# Patient Record
Sex: MALE | Race: WHITE | NOT HISPANIC OR LATINO | Employment: OTHER | URBAN - METROPOLITAN AREA
[De-identification: names, ages, dates, MRNs, and addresses within clinical notes are randomized per-mention and may not be internally consistent; named-entity substitution may affect disease eponyms.]

---

## 2017-08-15 ENCOUNTER — HISTORICAL (OUTPATIENT)
Dept: ADMINISTRATIVE | Facility: HOSPITAL | Age: 32
End: 2017-08-15

## 2017-08-15 LAB
ALBUMIN SERPL-MCNC: 3.9 GM/DL (ref 3.4–5)
ALBUMIN/GLOB SERPL: 1.3 RATIO (ref 1.1–2)
ALP SERPL-CCNC: 67 UNIT/L (ref 50–136)
ALT SERPL-CCNC: 17 UNIT/L (ref 12–78)
AST SERPL-CCNC: 14 UNIT/L (ref 15–37)
BILIRUB SERPL-MCNC: 0.6 MG/DL (ref 0.2–1)
BILIRUBIN DIRECT+TOT PNL SERPL-MCNC: 0.1 MG/DL (ref 0–0.5)
BILIRUBIN DIRECT+TOT PNL SERPL-MCNC: 0.5 MG/DL (ref 0–0.8)
BUN SERPL-MCNC: 12 MG/DL (ref 7–18)
CALCIUM SERPL-MCNC: 9.3 MG/DL (ref 8.5–10.1)
CHLORIDE SERPL-SCNC: 107 MMOL/L (ref 98–107)
CO2 SERPL-SCNC: 28 MMOL/L (ref 21–32)
CREAT SERPL-MCNC: 0.98 MG/DL (ref 0.7–1.3)
DEPRECATED CALCIDIOL+CALCIFEROL SERPL-MC: 40.06 NG/ML (ref 30–80)
ERYTHROCYTE [DISTWIDTH] IN BLOOD BY AUTOMATED COUNT: 12.6 % (ref 11.5–17)
GLOBULIN SER-MCNC: 2.9 GM/DL (ref 2.4–3.5)
GLUCOSE SERPL-MCNC: 92 MG/DL (ref 74–106)
HCT VFR BLD AUTO: 41.5 % (ref 42–52)
HGB BLD-MCNC: 13.9 GM/DL (ref 14–18)
MCH RBC QN AUTO: 30.9 PG (ref 27–31)
MCHC RBC AUTO-ENTMCNC: 33.5 GM/DL (ref 33–36)
MCV RBC AUTO: 92.2 FL (ref 80–94)
PLATELET # BLD AUTO: 203 X10(3)/MCL (ref 130–400)
PMV BLD AUTO: 10.5 FL (ref 9.4–12.4)
POTASSIUM SERPL-SCNC: 4.2 MMOL/L (ref 3.5–5.1)
PROT SERPL-MCNC: 6.8 GM/DL (ref 6.4–8.2)
PSA SERPL-MCNC: 0.27 NG/ML (ref 0–4)
RBC # BLD AUTO: 4.5 X10(6)/MCL (ref 4.7–6.1)
SODIUM SERPL-SCNC: 143 MMOL/L (ref 136–145)
WBC # SPEC AUTO: 8.5 X10(3)/MCL (ref 4.5–11.5)

## 2023-10-04 ENCOUNTER — OFFICE VISIT (OUTPATIENT)
Dept: FAMILY MEDICINE | Facility: CLINIC | Age: 38
End: 2023-10-04
Payer: COMMERCIAL

## 2023-10-04 VITALS
OXYGEN SATURATION: 96 % | WEIGHT: 173.63 LBS | HEIGHT: 71 IN | BODY MASS INDEX: 24.31 KG/M2 | HEART RATE: 82 BPM | DIASTOLIC BLOOD PRESSURE: 62 MMHG | SYSTOLIC BLOOD PRESSURE: 130 MMHG | TEMPERATURE: 98 F

## 2023-10-04 DIAGNOSIS — L73.2 HYDRADENITIS: Primary | ICD-10-CM

## 2023-10-04 DIAGNOSIS — Z28.21 INFLUENZA VACCINATION DECLINED BY PATIENT: ICD-10-CM

## 2023-10-04 DIAGNOSIS — F17.210 CIGARETTE SMOKER: ICD-10-CM

## 2023-10-04 PROCEDURE — 3078F DIAST BP <80 MM HG: CPT | Mod: CPTII,,, | Performed by: NURSE PRACTITIONER

## 2023-10-04 PROCEDURE — 99203 OFFICE O/P NEW LOW 30 MIN: CPT | Mod: ,,, | Performed by: NURSE PRACTITIONER

## 2023-10-04 PROCEDURE — 1160F RVW MEDS BY RX/DR IN RCRD: CPT | Mod: CPTII,,, | Performed by: NURSE PRACTITIONER

## 2023-10-04 PROCEDURE — 3008F BODY MASS INDEX DOCD: CPT | Mod: CPTII,,, | Performed by: NURSE PRACTITIONER

## 2023-10-04 PROCEDURE — 3078F PR MOST RECENT DIASTOLIC BLOOD PRESSURE < 80 MM HG: ICD-10-PCS | Mod: CPTII,,, | Performed by: NURSE PRACTITIONER

## 2023-10-04 PROCEDURE — 3075F PR MOST RECENT SYSTOLIC BLOOD PRESS GE 130-139MM HG: ICD-10-PCS | Mod: CPTII,,, | Performed by: NURSE PRACTITIONER

## 2023-10-04 PROCEDURE — 1159F PR MEDICATION LIST DOCUMENTED IN MEDICAL RECORD: ICD-10-PCS | Mod: CPTII,,, | Performed by: NURSE PRACTITIONER

## 2023-10-04 PROCEDURE — 99203 PR OFFICE/OUTPT VISIT, NEW, LEVL III, 30-44 MIN: ICD-10-PCS | Mod: ,,, | Performed by: NURSE PRACTITIONER

## 2023-10-04 PROCEDURE — 1159F MED LIST DOCD IN RCRD: CPT | Mod: CPTII,,, | Performed by: NURSE PRACTITIONER

## 2023-10-04 PROCEDURE — 3008F PR BODY MASS INDEX (BMI) DOCUMENTED: ICD-10-PCS | Mod: CPTII,,, | Performed by: NURSE PRACTITIONER

## 2023-10-04 PROCEDURE — 3075F SYST BP GE 130 - 139MM HG: CPT | Mod: CPTII,,, | Performed by: NURSE PRACTITIONER

## 2023-10-04 PROCEDURE — 1160F PR REVIEW ALL MEDS BY PRESCRIBER/CLIN PHARMACIST DOCUMENTED: ICD-10-PCS | Mod: CPTII,,, | Performed by: NURSE PRACTITIONER

## 2023-10-04 RX ORDER — TRAMADOL HYDROCHLORIDE 50 MG/1
50 TABLET ORAL EVERY 6 HOURS PRN
COMMUNITY
Start: 2023-08-01

## 2023-10-04 RX ORDER — DOXYCYCLINE 100 MG/1
100 CAPSULE ORAL 2 TIMES DAILY
Qty: 28 CAPSULE | Refills: 0 | Status: SHIPPED | OUTPATIENT
Start: 2023-10-04 | End: 2023-10-18

## 2023-10-04 RX ORDER — DOXYCYCLINE 100 MG/1
100 CAPSULE ORAL 2 TIMES DAILY
COMMUNITY
Start: 2023-08-12 | End: 2023-10-04 | Stop reason: SDUPTHER

## 2023-10-04 NOTE — PROGRESS NOTES
Patient ID: Juventino Doherty  : 1985     Chief Complaint: Establish Care (Establish care)    Allergies: Patient has No Known Allergies.     History of Present Illness:  The patient is a 38 y.o. White male who presents to clinic for evaluation and management with a chief complaint of Establish Care (Establish care)   Patient diagnosed with hidradenitis at urgent care over 6 months ago. Had outbreak of boils under both arms. Took doxycycline for 2 weeks with significant improvement. Currently has a few small bumps under right arm. No longer uses stick deodorants.          Past Medical History:  has a past medical history of Hidradenitis suppurativa.    Social History:  reports that he has been smoking cigarettes. He has never used smokeless tobacco. He reports that he does not currently use alcohol. He reports that he does not use drugs.    Care Team: Patient Care Team:  Max Lovett APRN as PCP - General (Family Medicine)     Current Medications:  Current Outpatient Medications   Medication Instructions    doxycycline (VIBRAMYCIN) 100 mg, Oral, 2 times daily    traMADoL (ULTRAM) 50 mg, Oral, Every 6 hours PRN       Review of Systems   Constitutional:  Negative for appetite change, fatigue, fever and unexpected weight change.   HENT:  Negative for nasal congestion, ear pain, facial swelling, hearing loss, mouth sores, nosebleeds, sore throat and trouble swallowing.    Eyes:  Negative for pain, discharge, redness and visual disturbance.   Respiratory:  Negative for cough, chest tightness and shortness of breath.    Cardiovascular:  Negative for chest pain, palpitations and leg swelling.   Gastrointestinal:  Negative for abdominal pain, blood in stool, constipation, diarrhea and nausea.   Endocrine: Negative for cold intolerance, heat intolerance, polydipsia, polyphagia and polyuria.   Genitourinary:  Negative for difficulty urinating, dysuria, frequency and hematuria.   Musculoskeletal:  Negative for arthralgias,  "joint swelling and joint deformity.   Integumentary:  Positive for mole/lesion. Negative for color change and rash.   Neurological:  Negative for dizziness, weakness, headaches and memory loss.   Hematological:  Negative for adenopathy. Does not bruise/bleed easily.   Psychiatric/Behavioral:  Negative for confusion, sleep disturbance and suicidal ideas.         Visit Vitals  /62 (BP Location: Left arm)   Pulse 82   Temp 97.9 °F (36.6 °C) (Temporal)   Ht 5' 11" (1.803 m)   Wt 78.7 kg (173 lb 9.6 oz)   SpO2 96%   BMI 24.21 kg/m²       Physical Exam  Vitals reviewed.   Constitutional:       General: He is not in acute distress.     Appearance: Normal appearance.   HENT:      Head: Normocephalic and atraumatic.      Right Ear: Tympanic membrane, ear canal and external ear normal.      Left Ear: Tympanic membrane, ear canal and external ear normal.      Nose: Nose normal. No congestion.      Mouth/Throat:      Mouth: Mucous membranes are moist.      Pharynx: Oropharynx is clear. No oropharyngeal exudate or posterior oropharyngeal erythema.   Eyes:      Conjunctiva/sclera: Conjunctivae normal.      Comments: Wearing eye glasses   Cardiovascular:      Rate and Rhythm: Normal rate and regular rhythm.      Pulses: Normal pulses.      Heart sounds: No murmur heard.  Pulmonary:      Effort: Pulmonary effort is normal.      Breath sounds: Normal breath sounds.   Abdominal:      General: Bowel sounds are normal.      Palpations: Abdomen is soft.      Tenderness: There is no abdominal tenderness.   Musculoskeletal:         General: No swelling, tenderness or deformity. Normal range of motion.      Cervical back: Neck supple.   Lymphadenopathy:      Cervical: No cervical adenopathy.   Skin:     General: Skin is warm and dry.      Capillary Refill: Capillary refill takes less than 2 seconds.      Coloration: Skin is not jaundiced.      Findings: Lesion (2 firm tender nodules to right axillae, largest 1.5cm with overlying " erythema) present. No rash.   Neurological:      Mental Status: He is alert and oriented to person, place, and time.      Cranial Nerves: No cranial nerve deficit.   Psychiatric:         Mood and Affect: Mood normal.         Behavior: Behavior normal.          Labs Reviewed:  Chemistry:  Lab Results   Component Value Date     08/15/2017    K 4.2 08/15/2017    CHLORIDE 107 08/15/2017    BUN 12.0 08/15/2017    CREATININE 0.98 08/15/2017    GLUCOSE 92 08/15/2017    CALCIUM 9.3 08/15/2017    ALKPHOS 67 08/15/2017    LABPROT 6.8 08/15/2017    ALBUMIN 3.90 08/15/2017    BILIDIR 0.10 08/15/2017    IBILI 0.50 08/15/2017    AST 14 (L) 08/15/2017    ALT 17 08/15/2017    JGHXPMGP85BU 40.06 08/15/2017    PSA 0.27 08/15/2017          Hematology:  Lab Results   Component Value Date    WBC 8.5 08/15/2017    RBC 4.50 (L) 08/15/2017    HGB 13.9 (L) 08/15/2017    HCT 41.5 (L) 08/15/2017    MCV 92.2 08/15/2017    MCH 30.9 08/15/2017    MCHC 33.5 08/15/2017    RDW 12.6 08/15/2017     08/15/2017    MPV 10.5 08/15/2017           Assessment & Plan:  1. Hydradenitis  Overview:  Takes doxycycline and ibuprofen during flare up. Refill doxycycline at this time. Patient will consider dermatology referral if frequent flares.       2. Influenza vaccination declined by patient    3. Cigarette smoker  Assessment & Plan:  Patient declines smoking cessation.      Other orders  -     doxycycline (VIBRAMYCIN) 100 MG Cap; Take 1 capsule (100 mg total) by mouth 2 (two) times daily. for 14 days  Dispense: 28 capsule; Refill: 0         No future appointments.    Follow up for at patient's convenience Wellness, Fasting labs prior. Call sooner if needed.    SARAH ESCALANTE

## 2023-11-09 ENCOUNTER — TELEPHONE (OUTPATIENT)
Dept: FAMILY MEDICINE | Facility: CLINIC | Age: 38
End: 2023-11-09
Payer: COMMERCIAL

## 2024-04-23 ENCOUNTER — OFFICE VISIT (OUTPATIENT)
Dept: FAMILY MEDICINE | Facility: CLINIC | Age: 39
End: 2024-04-23
Payer: COMMERCIAL

## 2024-04-23 VITALS
TEMPERATURE: 98 F | HEART RATE: 92 BPM | DIASTOLIC BLOOD PRESSURE: 62 MMHG | SYSTOLIC BLOOD PRESSURE: 110 MMHG | WEIGHT: 180 LBS | OXYGEN SATURATION: 98 % | BODY MASS INDEX: 25.2 KG/M2 | HEIGHT: 71 IN

## 2024-04-23 DIAGNOSIS — Z28.21 INFLUENZA VACCINATION DECLINED BY PATIENT: ICD-10-CM

## 2024-04-23 DIAGNOSIS — Z00.00 ENCOUNTER FOR WELLNESS EXAMINATION IN ADULT: Primary | ICD-10-CM

## 2024-04-23 DIAGNOSIS — E78.49 OTHER HYPERLIPIDEMIA: ICD-10-CM

## 2024-04-23 DIAGNOSIS — L73.2 HYDRADENITIS: ICD-10-CM

## 2024-04-23 DIAGNOSIS — F17.210 CIGARETTE SMOKER: ICD-10-CM

## 2024-04-23 PROCEDURE — 3074F SYST BP LT 130 MM HG: CPT | Mod: CPTII,,, | Performed by: NURSE PRACTITIONER

## 2024-04-23 PROCEDURE — 3008F BODY MASS INDEX DOCD: CPT | Mod: CPTII,,, | Performed by: NURSE PRACTITIONER

## 2024-04-23 PROCEDURE — 3044F HG A1C LEVEL LT 7.0%: CPT | Mod: CPTII,,, | Performed by: NURSE PRACTITIONER

## 2024-04-23 PROCEDURE — 1160F RVW MEDS BY RX/DR IN RCRD: CPT | Mod: CPTII,,, | Performed by: NURSE PRACTITIONER

## 2024-04-23 PROCEDURE — 1159F MED LIST DOCD IN RCRD: CPT | Mod: CPTII,,, | Performed by: NURSE PRACTITIONER

## 2024-04-23 PROCEDURE — 99396 PREV VISIT EST AGE 40-64: CPT | Mod: ,,, | Performed by: NURSE PRACTITIONER

## 2024-04-23 PROCEDURE — 3078F DIAST BP <80 MM HG: CPT | Mod: CPTII,,, | Performed by: NURSE PRACTITIONER

## 2024-04-23 NOTE — PROGRESS NOTES
Patient ID: Juventino Doherty  : 1985    Chief Complaint: Annual Exam    Allergies: Patient has No Known Allergies.     History of Present Illness:  The patient is a 39 y.o. White male who presents to clinic for annual wellness visit.    Diet and nutrition:  Diet is high in salt, high in fat, low in fiber, high caloric intake, high carbohydrate meals, high calcium intake.    Fracture risk: No history of fracture, no recent unexplained fracture.    Physical activity:  Does not exercise on a regular basis, good physical condition.    Depression risks:  No history of depression, never feel sad, empty, or tearful, no sleep disturbances, no agitation, no loss of energy, no feelings of worthlessness or guilt, no thoughts of suicide.    Orientation:  No disorientation to time, no disorientation to place.    Concentration and memory:  No decreased concentration ability, no memory lapses or loss, does not forget words.    Speech forward/motor difficulties: No speech difficulties, no difficulty expressing formulated concepts, no difficulty with fine manipulative tasks, no difficulty writing forward/copying, no slowed reaction time, does not knock things over when trying to pick them up.    Fall risk assessment:  No frequent falls while walking, no fall in the past year, no dizziness forward/vertigo, no fear of falling.  Hearing:  No loss of hearing, does not wear hearing aids.    Vision:  No vision problems, does wear glasses.    Activity of daily living: Able to bathe with limited or no assistance, able to control urination and bowels, able to dress with limited or no assistance, able to feed self with limited or no assistance, able to get out of chair or bed with limited or no assistance, able to [place] with limited or no assistance, able to toilet with limited are no assistance.    Activities of daily living:  Able to do housework with limited or no assistance, able to grocery shop with limited or no assistance, able to  manage medications with limited or no assistance, able to manage money with limited or no assistance, able to prepare meals with limited or no assistance, able to use the phone with limited or no assistance.    Screenings: due for vaccinations, not due for colorectal cancer screening.      Past Medical History:  has a past medical history of Hidradenitis suppurativa.    Surgical History:  has no past surgical history on file.    Family History: family history includes Diabetes in his father; Fibromyalgia in his mother; Hypertension in his father; Irritable bowel syndrome in his mother.    Social History:  reports that he has been smoking cigarettes. He started smoking about 22 years ago. He has a 22.3 pack-year smoking history. He has been exposed to tobacco smoke. He has never used smokeless tobacco. He reports that he does not currently use alcohol. He reports that he does not use drugs.    Care Team: Patient Care Team:  Max Lovett APRN as PCP - General (Family Medicine)     Current Medications:  No current outpatient medications    Review of Systems   Constitutional:  Negative for appetite change, fatigue, fever and unexpected weight change.   HENT:  Negative for nasal congestion, ear pain, facial swelling, hearing loss, mouth sores, nosebleeds, sore throat and trouble swallowing.    Eyes:  Negative for pain, discharge, redness and visual disturbance.   Respiratory:  Negative for cough, chest tightness and shortness of breath.    Cardiovascular:  Negative for chest pain, palpitations and leg swelling.   Gastrointestinal:  Negative for abdominal pain, blood in stool, constipation, diarrhea and nausea.   Endocrine: Negative for cold intolerance, heat intolerance, polydipsia, polyphagia and polyuria.   Genitourinary:  Negative for difficulty urinating, dysuria, frequency and hematuria.   Musculoskeletal:  Negative for arthralgias, joint swelling and joint deformity.   Integumentary:  Positive for mole/lesion.  "Negative for color change and rash.   Neurological:  Negative for dizziness, weakness, headaches and memory loss.   Hematological:  Negative for adenopathy. Does not bruise/bleed easily.   Psychiatric/Behavioral:  Negative for confusion, sleep disturbance and suicidal ideas.         Visit Vitals  /62 (BP Location: Right arm)   Pulse 92   Temp 98.2 °F (36.8 °C) (Temporal)   Ht 5' 11" (1.803 m)   Wt 81.6 kg (180 lb)   SpO2 98%   BMI 25.10 kg/m²       Physical Exam  Vitals reviewed.   Constitutional:       General: He is not in acute distress.     Appearance: Normal appearance.   HENT:      Head: Normocephalic and atraumatic.      Right Ear: Tympanic membrane, ear canal and external ear normal.      Left Ear: Tympanic membrane, ear canal and external ear normal.      Nose: Nose normal. No congestion.      Mouth/Throat:      Mouth: Mucous membranes are moist.      Pharynx: Oropharynx is clear. No oropharyngeal exudate or posterior oropharyngeal erythema.   Eyes:      Conjunctiva/sclera: Conjunctivae normal.      Comments: Wearing eye glasses   Cardiovascular:      Rate and Rhythm: Normal rate and regular rhythm.      Pulses: Normal pulses.      Heart sounds: No murmur heard.  Pulmonary:      Effort: Pulmonary effort is normal.      Breath sounds: Normal breath sounds.   Abdominal:      General: Bowel sounds are normal.      Palpations: Abdomen is soft.      Tenderness: There is no abdominal tenderness.   Musculoskeletal:         General: No swelling, tenderness or deformity. Normal range of motion.      Cervical back: Neck supple.   Lymphadenopathy:      Cervical: No cervical adenopathy.   Skin:     General: Skin is warm and dry.      Capillary Refill: Capillary refill takes less than 2 seconds.      Coloration: Skin is not jaundiced.      Findings: No rash.   Neurological:      Mental Status: He is alert and oriented to person, place, and time.      Cranial Nerves: No cranial nerve deficit.   Psychiatric:         " Mood and Affect: Mood normal.         Behavior: Behavior normal.          Labs Reviewed:  Chemistry:  Lab Results   Component Value Date     04/16/2024    K 4.6 04/16/2024    CHLORIDE 107 08/15/2017    BUN 8 04/16/2024    CREATININE 1.06 04/16/2024    EGFRNORACEVR 90 04/16/2024    GLUCOSE 92 08/15/2017    CALCIUM 9.7 04/16/2024    ALKPHOS 67 08/15/2017    LABPROT 6.8 08/15/2017    ALBUMIN 4.3 04/16/2024    BILIDIR 0.10 08/15/2017    IBILI 0.50 08/15/2017    AST 24 04/16/2024    ALT 23 04/16/2024    [id number] 40.06 08/15/2017    TSH 0.821 04/16/2024    PSA 0.27 08/15/2017        Lab Results   Component Value Date    HGBA1C 5.6 04/16/2024        Hematology:  Lab Results   Component Value Date    WBC 10.4 04/16/2024    RBC 4.66 04/16/2024    HGB 15.0 04/16/2024    HCT 44.2 04/16/2024    MCV 95 04/16/2024    MCH 32.2 04/16/2024    MCHC 33.9 04/16/2024    RDW 13.3 04/16/2024     04/16/2024    MPV 10.5 08/15/2017    MONO 8 04/16/2024    MONO 0.8 04/16/2024    BASO 0.1 04/16/2024    EOSINOPHIL 2 04/16/2024    BASOPHIL 1 04/16/2024       Lipid Panel:  Lab Results   Component Value Date    CHOL 249 (H) 04/16/2024    HDL 38 (L) 04/16/2024    TRIG 125 04/16/2024        Assessment & Plan:  1. Encounter for wellness examination in adult  Overview:  Colon Cancer Screening -  not due   Eye Exam- Recommend annually. Referred   Dental Exam- Recommend biannually.  Lung Cancer- not due      Orders:  -     Ambulatory referral/consult to Optometry; Future; Expected date: 04/30/2024    2. Hydradenitis  Overview:  Takes doxycycline and ibuprofen during flare up. Recurrent flares. Patient is ready for dermatology referral     Orders:  -     Ambulatory referral/consult to Dermatology; Future; Expected date: 04/30/2024    3. Other hyperlipidemia  Assessment & Plan:  The 10-year ASCVD risk score (Whitney LUZ, et al., 2019) is: 6.9%    Values used to calculate the score:      Age: 39 years      Sex: Male      Is Non-  : No      Diabetic: No      Tobacco smoker: Yes      Systolic Blood Pressure: 110 mmHg      Is BP treated: No      HDL Cholesterol: 38 mg/dL      Total Cholesterol: 249 mg/dL      The lipid level is currently abnormal. I would like to ask you to start using a low cholesterol diet, increase exercise and strive for weight loss. We will recheck these levels in one year.  Please refer to the following guidelines:    Cholesterol is a naturally occurring fatty substance that has both good and bad effects on the body.  On the good side,  it builds natural hormones and helps build and maintain nerve cells.  When your body has too much cholesterol, blood vessel walls can thicken and reduce circulation contributing to heart attacks and strokes. Most of the cholesterol in your blood is made by your liver from the fats, carbohydrates, and proteins  that you eat. Your provider may also check the amount of LDL (low-density lipoprotein) and HDL (high-density lipoprotein) in your blood.  LDLs carry a lot of cholesterol, leave behind fatty deposits on your artery walls, and contribute to heart disease.  HDLs do the opposite.  They clean the artery walls and remove extra cholesterol from the body, thus lowering the risk of heart disease.  LDL is sometimes called bad cholesterol and HDL good cholesterol.  It is desirable to have low levels of LDL and high levels of HDL.  (Smoking lowers HDL levels.)     Levels:   Total Cholesterol   ----------------------------------------   200 or below       good    200 to 239         borderline high    240 or above       high   ----------------------------------------    LDL Cholesterol   ---------------------------------------  130 or below       good   130 to 159         borderline high   160 or above       high   ---------------------------------------    For HDL, a level of 35 mg/dL or below is too low.  The recommended HDL level is 45 mg/dL or higher.        Controlling Your  Cholesterol Level   Cholesterol levels can usually be controlled by eating right and exercising.    Follow these diet guidelines to help control your cholesterol:   · Adjust the amount of calories you eat to maintain a lean body weight.    · Reduce the amount of fat you eat.  Fats should contribute no more than 30% of your daily calories and only 10% of the fat you eat should be saturated fat.    To control the amount of fat and cholesterol you eat:   · Check food labels for fat and cholesterol content.    · Limit the amount of butter and margarine you eat.    · Remove the skin before you cook chicken or turkey.    · Drink skim milk.    · Use sunflower, safflower, soybean, canola, or olive oil rather than tropical oils such as palm or coconut.    · Choose lean cuts of meat.    · Eat lean chicken, turkey, and fish instead of red meat.    · Use salad dressings and margarine made with monounsaturated fats.    · Eat fruits, vegetables, beans, and whole grains daily.  The fiber in these foods helps lower cholesterol.    · Use egg whites rather than whole eggs.    · Use low-fat yogurt or low-fat cottage cheese instead of sour cream.      Exercise goes hand-in-hand with good nutrition for controlling cholesterol.  Exercise not only helps you keep your weight down, but also can increase your HDL (good cholesterol) level.    A good exercise program includes aerobic exercise.  Aerobic exercise is any activity that keeps your heart rate up (such as swimming, jogging, walking, and bicycling).  You should get 20 to 30 minutes of aerobic exercise every other day.  Start your new exercise program slowly.  Make sure to stretch before and after you exercise.            4. Cigarette smoker  Assessment & Plan:  Declines smoking cessation       5. Influenza vaccination declined by patient         Vaccinations:    There is no immunization history on file for this patient.    Future Appointments   Date Time Provider Department Center    10/17/2024  8:50 AM [facility]   10/23/2024  1:00 PM Max Lovett APRN [facility]   4/17/2025  8:20 AM [facility], [facility]   4/24/2025  9:00 AM [facility]       Follow up for print endcare sum & pt educ 1)6mo Chol, Fasting labs prior, 2), 1 year, Wellness, fasting labs prior. Call sooner if needed.    SARAH ESCALANTE    Lab Frequency Next Occurrence   Ambulatory referral/consult to Dermatology Once 04/30/2024   Ambulatory referral/consult to Optometry Once 04/30/2024   Lipid Panel Once 10/23/2024   CBC Auto Differential Once 04/23/2025   Comprehensive Metabolic Panel Once 04/23/2025   Lipid Panel Once 04/23/2025   TSH Once 04/23/2025   Hemoglobin A1C Once 04/23/2025

## 2024-04-23 NOTE — ASSESSMENT & PLAN NOTE
The 10-year ASCVD risk score (Whitney LUZ, et al., 2019) is: 6.9%    Values used to calculate the score:      Age: 39 years      Sex: Male      Is Non- : No      Diabetic: No      Tobacco smoker: Yes      Systolic Blood Pressure: 110 mmHg      Is BP treated: No      HDL Cholesterol: 38 mg/dL      Total Cholesterol: 249 mg/dL      The lipid level is currently abnormal. I would like to ask you to start using a low cholesterol diet, increase exercise and strive for weight loss. We will recheck these levels in one year.  Please refer to the following guidelines:    Cholesterol is a naturally occurring fatty substance that has both good and bad effects on the body.  On the good side,  it builds natural hormones and helps build and maintain nerve cells.  When your body has too much cholesterol, blood vessel walls can thicken and reduce circulation contributing to heart attacks and strokes. Most of the cholesterol in your blood is made by your liver from the fats, carbohydrates, and proteins  that you eat. Your provider may also check the amount of LDL (low-density lipoprotein) and HDL (high-density lipoprotein) in your blood.  LDLs carry a lot of cholesterol, leave behind fatty deposits on your artery walls, and contribute to heart disease.  HDLs do the opposite.  They clean the artery walls and remove extra cholesterol from the body, thus lowering the risk of heart disease.  LDL is sometimes called bad cholesterol and HDL good cholesterol.  It is desirable to have low levels of LDL and high levels of HDL.  (Smoking lowers HDL levels.)     Levels:   Total Cholesterol   ----------------------------------------   200 or below       good    200 to 239         borderline high    240 or above       high   ----------------------------------------    LDL Cholesterol   ---------------------------------------  130 or below       good   130 to 159         borderline high   160 or above       high    ---------------------------------------    For HDL, a level of 35 mg/dL or below is too low.  The recommended HDL level is 45 mg/dL or higher.        Controlling Your Cholesterol Level   Cholesterol levels can usually be controlled by eating right and exercising.    Follow these diet guidelines to help control your cholesterol:   · Adjust the amount of calories you eat to maintain a lean body weight.    · Reduce the amount of fat you eat.  Fats should contribute no more than 30% of your daily calories and only 10% of the fat you eat should be saturated fat.    To control the amount of fat and cholesterol you eat:   · Check food labels for fat and cholesterol content.    · Limit the amount of butter and margarine you eat.    · Remove the skin before you cook chicken or turkey.    · Drink skim milk.    · Use sunflower, safflower, soybean, canola, or olive oil rather than tropical oils such as palm or coconut.    · Choose lean cuts of meat.    · Eat lean chicken, turkey, and fish instead of red meat.    · Use salad dressings and margarine made with monounsaturated fats.    · Eat fruits, vegetables, beans, and whole grains daily.  The fiber in these foods helps lower cholesterol.    · Use egg whites rather than whole eggs.    · Use low-fat yogurt or low-fat cottage cheese instead of sour cream.      Exercise goes hand-in-hand with good nutrition for controlling cholesterol.  Exercise not only helps you keep your weight down, but also can increase your HDL (good cholesterol) level.    A good exercise program includes aerobic exercise.  Aerobic exercise is any activity that keeps your heart rate up (such as swimming, jogging, walking, and bicycling).  You should get 20 to 30 minutes of aerobic exercise every other day.  Start your new exercise program slowly.  Make sure to stretch before and after you exercise.

## 2024-07-11 ENCOUNTER — TELEPHONE (OUTPATIENT)
Dept: FAMILY MEDICINE | Facility: CLINIC | Age: 39
End: 2024-07-11
Payer: COMMERCIAL

## 2024-07-11 DIAGNOSIS — F17.210 CIGARETTE SMOKER: Primary | ICD-10-CM

## 2024-07-11 RX ORDER — VARENICLINE TARTRATE 0.5 MG/1
TABLET, FILM COATED ORAL
Qty: 6 TABLET | Refills: 0 | Status: SHIPPED | OUTPATIENT
Start: 2024-07-11 | End: 2024-07-16

## 2024-07-11 RX ORDER — VARENICLINE TARTRATE 1 MG/1
1 TABLET, FILM COATED ORAL 2 TIMES DAILY
Qty: 60 TABLET | Refills: 2 | Status: SHIPPED | OUTPATIENT
Start: 2024-07-11

## 2024-07-11 NOTE — TELEPHONE ENCOUNTER
I have written for the following medications and/or orders for the patient.  Please notify the patient.  No orders of the defined types were placed in this encounter.      Medications Ordered This Encounter   Medications    varenicline (CHANTIX) 0.5 MG Tab     Sig: Take 1 tablet (0.5 mg total) by mouth once daily for 3 days, THEN 1 tablet (0.5 mg total) 2 (two) times daily for 3 days.     Dispense:  6 tablet     Refill:  0    varenicline (CHANTIX) 1 mg Tab     Sig: Take 1 tablet (1 mg total) by mouth 2 (two) times daily.     Dispense:  60 tablet     Refill:  2

## 2024-07-29 PROBLEM — Z00.00 ENCOUNTER FOR WELLNESS EXAMINATION IN ADULT: Status: RESOLVED | Noted: 2024-04-23 | Resolved: 2024-07-29

## 2025-04-22 ENCOUNTER — OFFICE VISIT (OUTPATIENT)
Dept: FAMILY MEDICINE | Facility: CLINIC | Age: 40
End: 2025-04-22
Payer: COMMERCIAL

## 2025-04-22 VITALS
TEMPERATURE: 98 F | BODY MASS INDEX: 25.37 KG/M2 | SYSTOLIC BLOOD PRESSURE: 110 MMHG | HEIGHT: 71 IN | DIASTOLIC BLOOD PRESSURE: 60 MMHG | HEART RATE: 90 BPM | OXYGEN SATURATION: 98 % | WEIGHT: 181.19 LBS

## 2025-04-22 DIAGNOSIS — Z87.891 HISTORY OF TOBACCO USE: ICD-10-CM

## 2025-04-22 DIAGNOSIS — Z00.00 ADULT WELLNESS VISIT: Primary | ICD-10-CM

## 2025-04-22 DIAGNOSIS — E78.49 OTHER HYPERLIPIDEMIA: ICD-10-CM

## 2025-04-22 PROBLEM — L73.2 HYDRADENITIS: Status: RESOLVED | Noted: 2023-10-04 | Resolved: 2025-04-22

## 2025-04-22 LAB
ALBUMIN SERPL-MCNC: 4.4 G/DL (ref 4.1–5.1)
ALP SERPL-CCNC: 63 IU/L (ref 44–121)
ALT SERPL-CCNC: 13 IU/L (ref 0–44)
AST SERPL-CCNC: 17 IU/L (ref 0–40)
BASOPHILS # BLD AUTO: 0.1 X10E3/UL (ref 0–0.2)
BASOPHILS NFR BLD AUTO: 1 %
BILIRUB SERPL-MCNC: 0.5 MG/DL (ref 0–1.2)
BUN SERPL-MCNC: 12 MG/DL (ref 6–24)
BUN/CREAT SERPL: 13 (ref 9–20)
CALCIUM SERPL-MCNC: 9.1 MG/DL (ref 8.7–10.2)
CHLORIDE SERPL-SCNC: 103 MMOL/L (ref 96–106)
CHOLEST SERPL-MCNC: 209 MG/DL (ref 100–199)
CO2 SERPL-SCNC: 24 MMOL/L (ref 20–29)
CREAT SERPL-MCNC: 0.96 MG/DL (ref 0.76–1.27)
EOSINOPHIL # BLD AUTO: 0.2 X10E3/UL (ref 0–0.4)
EOSINOPHIL NFR BLD AUTO: 2 %
ERYTHROCYTE [DISTWIDTH] IN BLOOD BY AUTOMATED COUNT: 12.9 % (ref 11.6–15.4)
EST. GFR  (NO RACE VARIABLE): 101 ML/MIN/1.73
GLOBULIN SER CALC-MCNC: 2.3 G/DL (ref 1.5–4.5)
GLUCOSE SERPL-MCNC: 105 MG/DL (ref 70–99)
HCT VFR BLD AUTO: 43.6 % (ref 37.5–51)
HDLC SERPL-MCNC: 38 MG/DL
HGB BLD-MCNC: 14.6 G/DL (ref 13–17.7)
IMM GRANULOCYTES NFR BLD AUTO: 0 %
LDLC SERPL CALC-MCNC: 147 MG/DL (ref 0–99)
LYMPHOCYTES # BLD AUTO: 3.9 X10E3/UL (ref 0.7–3.1)
LYMPHOCYTES NFR BLD AUTO: 39 %
MCH RBC QN AUTO: 31.2 PG (ref 26.6–33)
MCHC RBC AUTO-ENTMCNC: 33.5 G/DL (ref 31.5–35.7)
MCV RBC AUTO: 93 FL (ref 79–97)
MONOCYTES # BLD AUTO: 0.8 X10E3/UL (ref 0.1–0.9)
MONOCYTES NFR BLD AUTO: 8 %
NEUTROPHILS # BLD AUTO: 5 X10E3/UL (ref 1.4–7)
NEUTROPHILS NFR BLD AUTO: 50 %
PLATELET # BLD AUTO: 268 X10E3/UL (ref 150–450)
POTASSIUM SERPL-SCNC: 4.1 MMOL/L (ref 3.5–5.2)
PROT SERPL-MCNC: 6.7 G/DL (ref 6–8.5)
RBC # BLD AUTO: 4.68 X10E6/UL (ref 4.14–5.8)
SODIUM SERPL-SCNC: 139 MMOL/L (ref 134–144)
SPECIMEN STATUS REPORT: NORMAL
TRIGL SERPL-MCNC: 134 MG/DL (ref 0–149)
TSH SERPL DL<=0.005 MIU/L-ACNC: 0.58 UIU/ML (ref 0.45–4.5)
VLDLC SERPL CALC-MCNC: 24 MG/DL (ref 5–40)
WBC # BLD AUTO: 9.9 X10E3/UL (ref 3.4–10.8)

## 2025-04-22 PROCEDURE — 3074F SYST BP LT 130 MM HG: CPT | Mod: CPTII,,, | Performed by: NURSE PRACTITIONER

## 2025-04-22 PROCEDURE — 1160F RVW MEDS BY RX/DR IN RCRD: CPT | Mod: CPTII,,, | Performed by: NURSE PRACTITIONER

## 2025-04-22 PROCEDURE — 1159F MED LIST DOCD IN RCRD: CPT | Mod: CPTII,,, | Performed by: NURSE PRACTITIONER

## 2025-04-22 PROCEDURE — 3044F HG A1C LEVEL LT 7.0%: CPT | Mod: CPTII,,, | Performed by: NURSE PRACTITIONER

## 2025-04-22 PROCEDURE — 99396 PREV VISIT EST AGE 40-64: CPT | Mod: ,,, | Performed by: NURSE PRACTITIONER

## 2025-04-22 PROCEDURE — 3008F BODY MASS INDEX DOCD: CPT | Mod: CPTII,,, | Performed by: NURSE PRACTITIONER

## 2025-04-22 PROCEDURE — 3078F DIAST BP <80 MM HG: CPT | Mod: CPTII,,, | Performed by: NURSE PRACTITIONER

## 2025-04-22 NOTE — ASSESSMENT & PLAN NOTE
The 10-year ASCVD risk score (Whitney LUZ, et al., 2019) is: 1.7%    Values used to calculate the score:      Age: 40 years      Sex: Male      Is Non- : No      Diabetic: No      Tobacco smoker: No      Systolic Blood Pressure: 110 mmHg      Is BP treated: No      HDL Cholesterol: 38 mg/dL      Total Cholesterol: 209 mg/dL

## 2025-04-22 NOTE — PROGRESS NOTES
Patient ID: Juventino Doherty  : 1985    Chief Complaint: wellness    Allergies: Patient has no known allergies.     History of Present Illness:  The patient is a 40 y.o. White male who presents to clinic for annual wellness visit.    Diet and nutrition:  Diet is high in salt, high in fat, low in fiber, high caloric intake, high carbohydrate meals, high calcium intake.    Fracture risk: No history of fracture, no recent unexplained fracture.    Physical activity:  Does not exercise on a regular basis, good physical condition.    Depression risks:  No history of depression, never feel sad, empty, or tearful, no sleep disturbances, no agitation, no loss of energy, no feelings of worthlessness or guilt, no thoughts of suicide.    Orientation:  No disorientation to time, no disorientation to place.    Concentration and memory:  No decreased concentration ability, no memory lapses or loss, does not forget words.    Speech forward/motor difficulties: No speech difficulties, no difficulty expressing formulated concepts, no difficulty with fine manipulative tasks, no difficulty writing forward/copying, no slowed reaction time, does not knock things over when trying to pick them up.    Fall risk assessment:  No frequent falls while walking, no fall in the past year, no dizziness forward/vertigo, no fear of falling.  Hearing:  No loss of hearing, does not wear hearing aids.    Vision:  No vision problems, does wear glasses.    Activity of daily living: Able to bathe with limited or no assistance, able to control urination and bowels, able to dress with limited or no assistance, able to feed self with limited or no assistance, able to get out of chair or bed with limited or no assistance, able to [place] with limited or no assistance, able to toilet with limited are no assistance.    Activities of daily living:  Able to do housework with limited or no assistance, able to grocery shop with limited or no assistance, able to  manage medications with limited or no assistance, able to manage money with limited or no assistance, able to prepare meals with limited or no assistance, able to use the phone with limited or no assistance.    Screenings: due for vaccinations, not due for colorectal cancer screening.           Past Medical History:  has a past medical history of Hidradenitis suppurativa.    Surgical History:  has no past surgical history on file.    Family History: family history includes Diabetes in his father; Fibromyalgia in his mother; Hypertension in his father; Irritable bowel syndrome in his mother.    Social History:  reports that he quit smoking about 7 months ago. His smoking use included cigarettes. He started smoking about 23 years ago. He has a 22.7 pack-year smoking history. He has been exposed to tobacco smoke. He has never used smokeless tobacco. He reports that he does not currently use alcohol. He reports that he does not use drugs.    Care Team: Patient Care Team:  Max Lovett APRN as PCP - General (Family Medicine)  [facility] [employer]     Current Medications:  No current outpatient medications    Review of Systems   Constitutional:  Negative for appetite change, fatigue, fever and unexpected weight change.   HENT:  Negative for nasal congestion, ear pain, facial swelling, hearing loss, mouth sores, nosebleeds, sore throat and trouble swallowing.    Eyes:  Negative for pain, discharge, redness and visual disturbance.   Respiratory:  Negative for cough, chest tightness and shortness of breath.    Cardiovascular:  Negative for chest pain, palpitations and leg swelling.   Gastrointestinal:  Negative for abdominal pain, blood in stool, constipation, diarrhea and nausea.   Endocrine: Negative for cold intolerance, heat intolerance, polydipsia, polyphagia and polyuria.   Genitourinary:  Negative for difficulty urinating, dysuria, frequency and hematuria.   Musculoskeletal:  Negative for arthralgias, joint  "swelling and joint deformity.   Integumentary:  Positive for mole/lesion. Negative for color change and rash.   Neurological:  Negative for dizziness, weakness, headaches and memory loss.   Hematological:  Negative for adenopathy. Does not bruise/bleed easily.   Psychiatric/Behavioral:  Negative for confusion, sleep disturbance and suicidal ideas.         Visit Vitals  /60 (BP Location: Right arm, Patient Position: Sitting)   Pulse 90   Temp 97.7 °F (36.5 °C) (Temporal)   Ht 5' 10.98" (1.803 m)   Wt 82.2 kg (181 lb 3.2 oz)   SpO2 98%   BMI 25.28 kg/m²       Physical Exam  Vitals reviewed.   Constitutional:       General: He is not in acute distress.     Appearance: Normal appearance.   HENT:      Head: Normocephalic and atraumatic.      Right Ear: Tympanic membrane, ear canal and external ear normal.      Left Ear: Tympanic membrane, ear canal and external ear normal.      Nose: Nose normal. No congestion.      Mouth/Throat:      Mouth: Mucous membranes are moist.      Pharynx: Oropharynx is clear. No oropharyngeal exudate or posterior oropharyngeal erythema.   Eyes:      Conjunctiva/sclera: Conjunctivae normal.      Comments: Wearing eye glasses   Cardiovascular:      Rate and Rhythm: Normal rate and regular rhythm.      Pulses: Normal pulses.      Heart sounds: No murmur heard.  Pulmonary:      Effort: Pulmonary effort is normal.      Breath sounds: Normal breath sounds.   Abdominal:      General: Bowel sounds are normal.      Palpations: Abdomen is soft.      Tenderness: There is no abdominal tenderness.   Musculoskeletal:         General: No swelling, tenderness or deformity. Normal range of motion.      Cervical back: Neck supple.   Lymphadenopathy:      Cervical: No cervical adenopathy.   Skin:     General: Skin is warm and dry.      Capillary Refill: Capillary refill takes less than 2 seconds.      Coloration: Skin is not jaundiced.      Findings: No rash.   Neurological:      Mental Status: He is alert " and oriented to person, place, and time.      Cranial Nerves: No cranial nerve deficit.   Psychiatric:         Mood and Affect: Mood normal.         Behavior: Behavior normal.          Labs Reviewed:  Chemistry:  Lab Results   Component Value Date     04/20/2025    K 4.1 04/20/2025    BUN 12 04/20/2025    CREATININE 0.96 04/20/2025    EGFRNORACEVR 101 04/20/2025    GLUCOSE 92 08/15/2017    CALCIUM 9.1 04/20/2025    ALKPHOS 67 08/15/2017    LABPROT 6.8 08/15/2017    ALBUMIN 4.4 04/20/2025    BILIDIR 0.10 08/15/2017    IBILI 0.50 08/15/2017    AST 17 04/20/2025    ALT 13 04/20/2025    KHOFEOZV85HD 40.06 08/15/2017    TSH 0.578 04/20/2025    PSA 0.27 08/15/2017        Lab Results   Component Value Date    HGBA1C 5.5 04/21/2025        Hematology:  Lab Results   Component Value Date    WBC 9.9 04/20/2025    RBC 4.68 04/20/2025    HGB 14.6 04/20/2025    HCT 43.6 04/20/2025    MCV 93 04/20/2025    MCH 31.2 04/20/2025    MCHC 33.5 04/20/2025    RDW 12.9 04/20/2025     04/20/2025    MPV 10.5 08/15/2017    MONO 8 04/20/2025    MONO 0.8 04/20/2025    BASO 0.1 04/20/2025    EOSINOPHIL 2 04/20/2025    BASOPHIL 1 04/20/2025       Lipid Panel:  Lab Results   Component Value Date    CHOL 209 (H) 04/20/2025    HDL 38 (L) 04/20/2025    LDLCALC 147 (H) 04/20/2025    TRIG 134 04/20/2025         Assessment & Plan:  1. Adult wellness visit  Overview:  Colon Cancer Screening -  not due   Eye Exam- Recommend annually.  Dental Exam- Recommend biannually.  Lung Cancer- not due        2. History of tobacco use  Overview:  Quit sept 2024 with use of Chantix.       3. Other hyperlipidemia  Assessment & Plan:  The 10-year ASCVD risk score (Whitney LUZ, et al., 2019) is: 1.7%    Values used to calculate the score:      Age: 40 years      Sex: Male      Is Non- : No      Diabetic: No      Tobacco smoker: No      Systolic Blood Pressure: 110 mmHg      Is BP treated: No      HDL Cholesterol: 38 mg/dL      Total  Cholesterol: 209 mg/dL             Vaccinations:    There is no immunization history on file for this patient.    No future appointments.    Follow up for print pt education  1), 1yr, Wellness, Fasting labs prior. Call sooner if needed.    SARAH ESCALANTE        Lab Frequency Next Occurrence   Ambulatory referral/consult to Dermatology Once 04/30/2024   Ambulatory referral/consult to Optometry Once 04/30/2024